# Patient Record
Sex: FEMALE | Race: WHITE | Employment: FULL TIME | URBAN - METROPOLITAN AREA
[De-identification: names, ages, dates, MRNs, and addresses within clinical notes are randomized per-mention and may not be internally consistent; named-entity substitution may affect disease eponyms.]

---

## 2022-07-29 ENCOUNTER — HOSPITAL ENCOUNTER (EMERGENCY)
Age: 22
Discharge: HOME | End: 2022-07-29
Attending: EMERGENCY MEDICINE
Payer: COMMERCIAL

## 2022-07-29 ENCOUNTER — APPOINTMENT (OUTPATIENT)
Dept: GENERAL RADIOLOGY | Age: 22
End: 2022-07-29
Attending: EMERGENCY MEDICINE
Payer: COMMERCIAL

## 2022-07-29 VITALS
HEIGHT: 69 IN | HEART RATE: 103 BPM | BODY MASS INDEX: 19.26 KG/M2 | DIASTOLIC BLOOD PRESSURE: 66 MMHG | TEMPERATURE: 98 F | RESPIRATION RATE: 16 BRPM | OXYGEN SATURATION: 98 % | SYSTOLIC BLOOD PRESSURE: 108 MMHG | WEIGHT: 130 LBS

## 2022-07-29 DIAGNOSIS — S16.1XXA NECK MUSCLE STRAIN, INITIAL ENCOUNTER: Primary | ICD-10-CM
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
## 2022-07-29 PROCEDURE — 72050 X-RAY EXAM NECK SPINE 4/5VWS: CPT

## 2022-07-29 PROCEDURE — 99283 EMERGENCY DEPT VISIT LOW MDM: CPT

## 2022-07-29 RX ORDER — IBUPROFEN 600 MG/1
600 TABLET ORAL
Status: DISCONTINUED | OUTPATIENT
Start: 2022-07-29 | End: 2022-07-29 | Stop reason: HOSPADM

## 2022-07-29 RX ORDER — LIDOCAINE 50 MG/G
PATCH TOPICAL
Qty: 5 EACH | Refills: 0 | Status: SHIPPED | OUTPATIENT
Start: 2022-07-29

## 2022-07-29 RX ORDER — LIDOCAINE 4 G/100G
1 PATCH TOPICAL
Status: DISCONTINUED | OUTPATIENT
Start: 2022-07-29 | End: 2022-07-29 | Stop reason: HOSPADM

## 2022-07-29 RX ORDER — METHOCARBAMOL 750 MG/1
750 TABLET, FILM COATED ORAL 4 TIMES DAILY
Qty: 20 TABLET | Refills: 0 | Status: SHIPPED | OUTPATIENT
Start: 2022-07-29 | End: 2022-08-03

## 2022-07-29 RX ORDER — NAPROXEN 500 MG/1
500 TABLET ORAL 2 TIMES DAILY WITH MEALS
Qty: 14 TABLET | Refills: 0 | Status: SHIPPED | OUTPATIENT
Start: 2022-07-29 | End: 2022-08-05
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
## 2023-09-26 SDOH — HEALTH STABILITY: PHYSICAL HEALTH: ON AVERAGE, HOW MANY MINUTES DO YOU ENGAGE IN EXERCISE AT THIS LEVEL?: 30 MIN

## 2023-09-26 SDOH — HEALTH STABILITY: PHYSICAL HEALTH: ON AVERAGE, HOW MANY DAYS PER WEEK DO YOU ENGAGE IN MODERATE TO STRENUOUS EXERCISE (LIKE A BRISK WALK)?: 4 DAYS

## 2023-09-27 ENCOUNTER — OFFICE VISIT (OUTPATIENT)
Dept: PRIMARY CARE CLINIC | Facility: CLINIC | Age: 23
End: 2023-09-27
Payer: COMMERCIAL

## 2023-09-27 ENCOUNTER — TELEPHONE (OUTPATIENT)
Dept: PRIMARY CARE CLINIC | Facility: CLINIC | Age: 23
End: 2023-09-27

## 2023-09-27 VITALS
HEIGHT: 69 IN | BODY MASS INDEX: 19.7 KG/M2 | DIASTOLIC BLOOD PRESSURE: 68 MMHG | SYSTOLIC BLOOD PRESSURE: 106 MMHG | WEIGHT: 133 LBS | HEART RATE: 71 BPM | OXYGEN SATURATION: 100 %

## 2023-09-27 DIAGNOSIS — Z13.6 ENCOUNTER FOR LIPID SCREENING FOR CARDIOVASCULAR DISEASE: ICD-10-CM

## 2023-09-27 DIAGNOSIS — D68.2 FACTOR II DEFICIENCY (HCC): ICD-10-CM

## 2023-09-27 DIAGNOSIS — D68.2 FACTOR II DEFICIENCY (HCC): Primary | ICD-10-CM

## 2023-09-27 DIAGNOSIS — Z13.220 ENCOUNTER FOR LIPID SCREENING FOR CARDIOVASCULAR DISEASE: ICD-10-CM

## 2023-09-27 DIAGNOSIS — G43.709 CHRONIC MIGRAINE WITHOUT AURA WITHOUT STATUS MIGRAINOSUS, NOT INTRACTABLE: Primary | ICD-10-CM

## 2023-09-27 DIAGNOSIS — G43.709 CHRONIC MIGRAINE W/O AURA, NOT INTRACTABLE, W/O STAT MIGR: ICD-10-CM

## 2023-09-27 PROCEDURE — 99204 OFFICE O/P NEW MOD 45 MIN: CPT | Performed by: FAMILY MEDICINE

## 2023-09-27 RX ORDER — SUMATRIPTAN 50 MG/1
TABLET, FILM COATED ORAL
Qty: 9 TABLET | Refills: 5 | Status: SHIPPED | OUTPATIENT
Start: 2023-09-27

## 2023-09-27 SDOH — ECONOMIC STABILITY: FOOD INSECURITY: WITHIN THE PAST 12 MONTHS, YOU WORRIED THAT YOUR FOOD WOULD RUN OUT BEFORE YOU GOT MONEY TO BUY MORE.: PATIENT DECLINED

## 2023-09-27 SDOH — ECONOMIC STABILITY: INCOME INSECURITY: HOW HARD IS IT FOR YOU TO PAY FOR THE VERY BASICS LIKE FOOD, HOUSING, MEDICAL CARE, AND HEATING?: PATIENT DECLINED

## 2023-09-27 SDOH — ECONOMIC STABILITY: HOUSING INSECURITY
IN THE LAST 12 MONTHS, WAS THERE A TIME WHEN YOU DID NOT HAVE A STEADY PLACE TO SLEEP OR SLEPT IN A SHELTER (INCLUDING NOW)?: PATIENT REFUSED

## 2023-09-27 SDOH — ECONOMIC STABILITY: FOOD INSECURITY: WITHIN THE PAST 12 MONTHS, THE FOOD YOU BOUGHT JUST DIDN'T LAST AND YOU DIDN'T HAVE MONEY TO GET MORE.: PATIENT DECLINED

## 2023-09-27 ASSESSMENT — PATIENT HEALTH QUESTIONNAIRE - PHQ9
SUM OF ALL RESPONSES TO PHQ QUESTIONS 1-9: 0
1. LITTLE INTEREST OR PLEASURE IN DOING THINGS: 0
SUM OF ALL RESPONSES TO PHQ9 QUESTIONS 1 & 2: 0
SUM OF ALL RESPONSES TO PHQ QUESTIONS 1-9: 0
2. FEELING DOWN, DEPRESSED OR HOPELESS: 0
SUM OF ALL RESPONSES TO PHQ QUESTIONS 1-9: 0
SUM OF ALL RESPONSES TO PHQ QUESTIONS 1-9: 0

## 2023-09-28 NOTE — TELEPHONE ENCOUNTER
Called patient - gave information to scheduling team.     Asked patient if she had any metal in her body?  She stated \"no, not that Im aware of\"

## 2023-10-03 ENCOUNTER — OFFICE VISIT (OUTPATIENT)
Age: 23
End: 2023-10-03
Payer: COMMERCIAL

## 2023-10-03 VITALS
HEART RATE: 80 BPM | OXYGEN SATURATION: 98 % | BODY MASS INDEX: 19.55 KG/M2 | DIASTOLIC BLOOD PRESSURE: 73 MMHG | SYSTOLIC BLOOD PRESSURE: 109 MMHG | RESPIRATION RATE: 18 BRPM | WEIGHT: 132.4 LBS

## 2023-10-03 DIAGNOSIS — D68.52 PROTHROMBIN GENE MUTATION (HCC): Primary | ICD-10-CM

## 2023-10-03 PROCEDURE — 99203 OFFICE O/P NEW LOW 30 MIN: CPT | Performed by: INTERNAL MEDICINE

## 2023-10-03 NOTE — PROGRESS NOTES
Kamini Razo is a 24 y.o. female     Chief Complaint   Patient presents with    Follow-up     prothrombin gene mutation       1. Have you been to the ER, urgent care clinic since your last visit? Hospitalized since your last visit? No    2. Have you seen or consulted any other health care providers outside of the [address] [place] [address] Avenue since your last visit? Include any pap smears or colon screening.   Yes , allergy partners

## 2023-10-03 NOTE — PROGRESS NOTES
Kari at [address], [address] [place] [address]  W: [phone]  F: [phone]    Reason for Visit:   Kailey Souza is a 24 y.o. female who is seen in consultation at the request of Dr. Eva Aguilera for evaluation of prothrombin gene mutation. History of Present Illness:   Kailey Souza is a pleasant 24 y.o. female who presents today for evaluation of prothrombin gene mutation. Patient recently seen by PCP to establish care. At that visit, she reported history of prothrombin gene mutation. She has never had a history of a blood clot. She is mindful to avoid estrogen-containing OCPs and has only ever been on progestin-only OCPs. She currently has copper IUD. At that visit, she was complaining of headaches. She states she has had these off/on always but in the last year, they have become more debilitating. She does get photophobia/phonophobia. She has to sleep them off and they go away. If she pushes in on her eye, that pressure alleviates the headache pain. She would take Excederin with good effect but has to be taken at the onset of the headaches, otherwise no improvement. States that she was diagnosed in ~2005 or 2006. Her paternal aunt was pregnant with her first born, and during third trimester developed blood clot. She was the first individual diagnosed. 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 Never smoker. Social EtOH use. Review of systems was obtained and pertinent findings reviewed above. Past medical history, social history, family history, medications, and allergies are located in the electronic medical record.     Physical normal S1, S2 heard

## 2023-10-05 DIAGNOSIS — Z13.220 ENCOUNTER FOR LIPID SCREENING FOR CARDIOVASCULAR DISEASE: ICD-10-CM

## 2023-10-05 DIAGNOSIS — D68.52 PROTHROMBIN GENE MUTATION (HCC): ICD-10-CM

## 2023-10-05 DIAGNOSIS — G43.709 CHRONIC MIGRAINE W/O AURA, NOT INTRACTABLE, W/O STAT MIGR: ICD-10-CM

## 2023-10-05 DIAGNOSIS — Z13.6 ENCOUNTER FOR LIPID SCREENING FOR CARDIOVASCULAR DISEASE: ICD-10-CM

## 2023-10-06 ENCOUNTER — HOSPITAL ENCOUNTER (OUTPATIENT)
Facility: HOSPITAL | Age: 23
Discharge: HOME | End: 2023-10-06
Payer: COMMERCIAL

## 2023-10-06 DIAGNOSIS — G43.709 CHRONIC MIGRAINE WITHOUT AURA WITHOUT STATUS MIGRAINOSUS, NOT INTRACTABLE: ICD-10-CM

## 2023-10-06 DIAGNOSIS — D68.2 FACTOR II DEFICIENCY (HCC): ICD-10-CM

## 2023-10-06 LAB
ALBUMIN SERPL-MCNC: 3.9 G/DL (ref 3.5–5)
ALBUMIN/GLOB SERPL: 1.3 (ref 1.1–2.2)
ALP SERPL-CCNC: 44 U/L (ref 45–117)
ALT SERPL-CCNC: 19 U/L (ref 12–78)
ANION GAP SERPL CALC-SCNC: 4 MMOL/L (ref 5–15)
AST SERPL-CCNC: 8 U/L (ref 15–37)
BILIRUB SERPL-MCNC: 0.7 MG/DL (ref 0.2–1)
BUN SERPL-MCNC: 14 MG/DL (ref 6–20)
BUN/CREAT SERPL: 16 (ref 12–20)
CALCIUM SERPL-MCNC: 9.1 MG/DL (ref 8.5–10.1)
CHLORIDE SERPL-SCNC: 109 MMOL/L (ref 97–108)
CHOLEST SERPL-MCNC: 149 MG/DL
CO2 SERPL-SCNC: 27 MMOL/L (ref 21–32)
CREAT SERPL-MCNC: 0.89 MG/DL (ref 0.55–1.02)
ERYTHROCYTE [DISTWIDTH] IN BLOOD BY AUTOMATED COUNT: 12.3 % (ref 11.5–14.5)
GLOBULIN SER CALC-MCNC: 3.1 G/DL (ref 2–4)
GLUCOSE SERPL-MCNC: 96 MG/DL (ref 65–100)
HCT VFR BLD AUTO: 40 % (ref 35–47)
HDLC SERPL-MCNC: 67 MG/DL
HDLC SERPL: 2.2 (ref 0–5)
HGB BLD-MCNC: 13.3 G/DL (ref 11.5–16)
LDLC SERPL CALC-MCNC: 73 MG/DL (ref 0–100)
MCH RBC QN AUTO: 28.7 PG (ref 26–34)
MCHC RBC AUTO-ENTMCNC: 33.3 G/DL (ref 30–36.5)
MCV RBC AUTO: 86.2 FL (ref 80–99)
NRBC # BLD: 0 K/UL (ref 0–0.01)
NRBC BLD-RTO: 0 PER 100 WBC
PLATELET # BLD AUTO: 230 K/UL (ref 150–400)
PMV BLD AUTO: 12.5 FL (ref 8.9–12.9)
POTASSIUM SERPL-SCNC: 4.4 MMOL/L (ref 3.5–5.1)
PROT SERPL-MCNC: 7 G/DL (ref 6.4–8.2)
RBC # BLD AUTO: 4.64 M/UL (ref 3.8–5.2)
SODIUM SERPL-SCNC: 140 MMOL/L (ref 136–145)
TRIGL SERPL-MCNC: 45 MG/DL
VLDLC SERPL CALC-MCNC: 9 MG/DL
WBC # BLD AUTO: 4.9 K/UL (ref 3.6–11)

## 2023-10-06 PROCEDURE — 70551 MRI BRAIN STEM W/O DYE: CPT

## 2023-10-11 LAB
F2 GENE MUT ANL BLD/T: ABNORMAL
IMP & REVIEW OF LAB RESULTS: ABNORMAL

## 2023-12-01 ENCOUNTER — TELEPHONE (OUTPATIENT)
Dept: PRIMARY CARE CLINIC | Facility: CLINIC | Age: 23
End: 2023-12-01

## 2023-12-01 ENCOUNTER — NURSE TRIAGE (OUTPATIENT)
Dept: OTHER | Facility: CLINIC | Age: 23
End: 2023-12-01

## 2023-12-01 NOTE — TELEPHONE ENCOUNTER
Patient contacted the answering service stating that she has a cold sore on the bottom of her lip that has become painful. She is requesting antiviral meds. Advised her that (per chart review), PCP  would like to see her in office to manage this. Discussed OTC meds that she can take. Patient expressed understanding and will contact office during business hours.

## 2023-12-01 NOTE — TELEPHONE ENCOUNTER
[place]    Received call from [address] at [facility] with [employer] Complaint. Subjective: Caller states \"Cold sore 3/4 inch long, lip swelling on bottom lip. \"     Current Symptoms: See above    Onset: 2 days ago; sudden    Associated Symptoms: NA    Pain Severity: 6/10; burning; intermittent    Temperature: Denies     What has been tried: Yung    LMP:  Currently      Pregnant: NO    Recommended disposition:  Call back by PCP today    Care advice provided, patient verbalizes understanding; denies any other questions or concerns; instructed to call back for any new or worsening symptoms. Attention Provider: Thank you for allowing me to participate in the care of your patient. The patient was connected to triage in response to information provided to the ECC/PSC. Please do not respond through this encounter as the response is not directed to a shared pool.     Reason for Disposition   Herpes sores are a recurrent problem, and caller wants a prescription medicine to take the next time they occur    Protocols used: Cold Sores (Fever Blisters)-ADULT-[place]

## 2023-12-04 ENCOUNTER — TELEPHONE (OUTPATIENT)
Dept: PRIMARY CARE CLINIC | Facility: CLINIC | Age: 23
End: 2023-12-04

## 2023-12-04 NOTE — TELEPHONE ENCOUNTER
----- Message from Talia Barreto sent at 12/1/2023  2:32 PM EST -----  Subject: Message to Provider    QUESTIONS  Information for Provider? Patient has a cold sore on bottom lip and would   like Alaina Arora to send in a script for it. The cold sore is about   3/4 in long and is causing her bottom lip to swell. She has been using   abrievia and it's not helping  ---------------------------------------------------------------------------  --------------  CALL BACK INFO  [phone]; OK to leave message on voicemail  ---------------------------------------------------------------------------  --------------  SCRIPT ANSWERS  Relationship to Patient? Self

## 2023-12-05 NOTE — TELEPHONE ENCOUNTER
LVM for patient that an appointment would be needed for a script for a cold sore.Advised she can also be seen at urgent care.

## 2024-02-04 DIAGNOSIS — G43.709 CHRONIC MIGRAINE W/O AURA, NOT INTRACTABLE, W/O STAT MIGR: ICD-10-CM

## 2024-02-07 RX ORDER — SUMATRIPTAN 50 MG/1
TABLET, FILM COATED ORAL
Qty: 9 TABLET | Refills: 5 | Status: SHIPPED | OUTPATIENT
Start: 2024-02-07

## 2024-03-07 ENCOUNTER — TELEMEDICINE (OUTPATIENT)
Dept: PRIMARY CARE CLINIC | Facility: CLINIC | Age: 24
End: 2024-03-07
Payer: COMMERCIAL

## 2024-03-07 DIAGNOSIS — T78.40XS ALLERGY, SEQUELA: ICD-10-CM

## 2024-03-07 DIAGNOSIS — H01.9 DERMATITIS OF EYELIDS OF BOTH EYES, UNSPECIFIED TYPE: Primary | ICD-10-CM

## 2024-03-07 PROCEDURE — 99204 OFFICE O/P NEW MOD 45 MIN: CPT | Performed by: NURSE PRACTITIONER

## 2024-03-07 RX ORDER — DIAPER,BRIEF,INFANT-TODD,DISP
EACH MISCELLANEOUS 2 TIMES DAILY PRN
Qty: 1 EACH | Refills: 0 | Status: SHIPPED | OUTPATIENT
Start: 2024-03-07

## 2024-03-07 SDOH — ECONOMIC STABILITY: FOOD INSECURITY: WITHIN THE PAST 12 MONTHS, YOU WORRIED THAT YOUR FOOD WOULD RUN OUT BEFORE YOU GOT MONEY TO BUY MORE.: NEVER TRUE

## 2024-03-07 SDOH — ECONOMIC STABILITY: INCOME INSECURITY: HOW HARD IS IT FOR YOU TO PAY FOR THE VERY BASICS LIKE FOOD, HOUSING, MEDICAL CARE, AND HEATING?: NOT HARD AT ALL

## 2024-03-07 SDOH — ECONOMIC STABILITY: FOOD INSECURITY: WITHIN THE PAST 12 MONTHS, THE FOOD YOU BOUGHT JUST DIDN'T LAST AND YOU DIDN'T HAVE MONEY TO GET MORE.: NEVER TRUE

## 2024-03-07 SDOH — ECONOMIC STABILITY: HOUSING INSECURITY
IN THE LAST 12 MONTHS, WAS THERE A TIME WHEN YOU DID NOT HAVE A STEADY PLACE TO SLEEP OR SLEPT IN A SHELTER (INCLUDING NOW)?: NO

## 2024-03-07 ASSESSMENT — PATIENT HEALTH QUESTIONNAIRE - PHQ9
1. LITTLE INTEREST OR PLEASURE IN DOING THINGS: 0
SUM OF ALL RESPONSES TO PHQ QUESTIONS 1-9: 0
SUM OF ALL RESPONSES TO PHQ QUESTIONS 1-9: 0
SUM OF ALL RESPONSES TO PHQ9 QUESTIONS 1 & 2: 0
2. FEELING DOWN, DEPRESSED OR HOPELESS: 0
SUM OF ALL RESPONSES TO PHQ QUESTIONS 1-9: 0
SUM OF ALL RESPONSES TO PHQ QUESTIONS 1-9: 0

## 2024-03-07 ASSESSMENT — ENCOUNTER SYMPTOMS
EYE ITCHING: 1
COLOR CHANGE: 1
EYE REDNESS: 0
EYE DISCHARGE: 0
PHOTOPHOBIA: 0
EYE PAIN: 0

## 2024-03-07 NOTE — PROGRESS NOTES
\"Have you been to the ER, urgent care clinic since your last visit?  Hospitalized since your last visit?\"    Urgent Care 12/24 cold sore ans virus    “Have you seen or consulted any other health care providers outside of [facility] since your last visit?”    Allergist            
10/05/2023 1.3  1.1 - 2.2   Final    WBC 10/05/2023 4.9  3.6 - 11.0 K/uL Final    RBC 10/05/2023 4.64  3.80 - 5.20 M/uL Final    Hemoglobin 10/05/2023 13.3  11.5 - 16.0 g/dL Final    Hematocrit 10/05/2023 40.0  35.0 - 47.0 % Final    MCV 10/05/2023 86.2  80.0 - 99.0 FL Final    MCH 10/05/2023 28.7  26.0 - 34.0 PG Final    MCHC 10/05/2023 33.3  30.0 - 36.5 g/dL Final    RDW 10/05/2023 12.3  11.5 - 14.5 % Final    Platelets 10/05/2023 230  150 - 400 K/uL Final    MPV 10/05/2023 12.5  8.9 - 12.9 FL Final    Nucleated RBCs 10/05/2023 0.0  0  WBC Final    nRBC 10/05/2023 0.00  0.00 - 0.01 K/uL Final       MRI BRAIN WO CONTRAST  Narrative: EXAM: MRI BRAIN WO CONTRAST    INDICATION: Chronic migraine without aura, not intractable, without status  migrainosus; Hereditary deficiency of other clotting factors    COMPARISON: None.    CONTRAST: None.    TECHNIQUE:    Multiplanar multisequence acquisition without contrast of the brain.    FINDINGS:  The ventricles are normal in size and position. The brain parenchyma has normal  signal characteristics. Incidental 8 mm pineal cyst. There is no acute infarct,  hemorrhage, extra-axial fluid collection, or mass effect. There is no cerebellar  tonsillar herniation. Expected arterial flow-voids are present.    The paranasal sinuses, mastoid air cells, and middle ears are clear. The orbital  contents are within normal limits. No significant osseous or scalp lesions are  identified.  Impression: 1. No significant intracranial abnormality. Incidental 8 mm pineal cyst.     Current Outpatient Medications   Medication Sig Dispense Refill    hydrocortisone 0.5 % ointment Apply topically 2 times daily as needed (rash) 1 each 0    SUMAtriptan (IMITREX) 50 MG tablet If symptoms fail to improve after 2 hours take 2nd tab. Do not take more then 2 tabs in 24 hours. 9 tablet 5    Cetirizine HCl (ZYRTEC ALLERGY PO)       Multiple Vitamins-Minerals (MULTI-VITAMIN GUMMIES PO)       EPINEPHRINE,

## 2024-08-05 ENCOUNTER — TELEPHONE (OUTPATIENT)
Age: 24
End: 2024-08-05

## 2024-08-05 NOTE — TELEPHONE ENCOUNTER
Pt called to inquire if there was anything she should be concerned about while training for a marathon in regards to her blood condition. Call back number is [phone].

## 2024-08-07 ENCOUNTER — PATIENT MESSAGE (OUTPATIENT)
Age: 24
End: 2024-08-07

## 2024-08-29 ENCOUNTER — OFFICE VISIT (OUTPATIENT)
Age: 24
End: 2024-08-29
Payer: COMMERCIAL

## 2024-08-29 VITALS
TEMPERATURE: 98.2 F | SYSTOLIC BLOOD PRESSURE: 99 MMHG | HEIGHT: 69 IN | DIASTOLIC BLOOD PRESSURE: 57 MMHG | HEART RATE: 86 BPM | OXYGEN SATURATION: 96 % | BODY MASS INDEX: 19.99 KG/M2 | WEIGHT: 135 LBS

## 2024-08-29 DIAGNOSIS — Z01.419 ENCOUNTER FOR GYNECOLOGICAL EXAMINATION: Primary | ICD-10-CM

## 2024-08-29 PROCEDURE — 99385 PREV VISIT NEW AGE 18-39: CPT | Performed by: OBSTETRICS & GYNECOLOGY

## 2024-08-29 NOTE — PROGRESS NOTES
Chief Complaint   Patient presents with    New Patient    Annual Exam       Ob/Gyn Hx:  G0  LMP - No LMP recorded.  Menses - regular   Contraception - Copper IUD  Hx of STI - none, declined  SA - yes male partner.    Health maintenance:  Last Pap: 2 years ago- normal per patient. Patient notes no history of abnormal pap smears.  Gardasil: series completed.    1. Have you been to the ER, urgent care clinic, or hospitalized since your last visit? This is the  patients first visit with the practice.     2. Have you seen or consulted any other health care providers outside of the [facility] since your last visit?  This is the  patients first visit with the practice.     Patient declines chaperone.    Graciela Tellez MA

## 2024-08-29 NOTE — PROGRESS NOTES
Annual Exam    Chief Complaint   Patient presents with    New Patient    Annual Exam     Megan Hernández is a 24 y.o. presenting for annual exam. Her main concerns today include well woman exam    Her last pap test was approximately 2 years ago. No history of abnormals.    She is sexually active with a male partner. She uses copper IUD for contraception. She notes that it was placed in 2019. She has regular monthly periods with the copper IUD in place.    She denies any history of STIs. She declines STI testing today.    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    She has previously completed her Gardasil vaccine series.    She works as a [employer]. Her  is a 4th year medical student- going into anesthesia.    OB History    Para Term  AB Living   0 0 0 0 0 0   SAB IAB Ectopic Molar Multiple Live Births   0 0 0 0 0 0       Past Medical History:   Diagnosis Date    Disease of blood and blood forming organ     Factor 2 clotting disorder    Factor II deficiency (HCC)     Migraines        Past Surgical History:   Procedure Laterality Date    WISDOM TOOTH EXTRACTION         Family History   Problem Relation Age of Onset    Cancer Maternal Grandfather     Diabetes Paternal Grandmother     Cancer Paternal Grandfather     Other Other     Migraines Mother        Social History     Socioeconomic History    Marital status:      Spouse name: Not on file    Number of children: Not on file    Years of education: Not on file    Highest education level: Not on file   Occupational History    Not on file   Tobacco Use    Smoking status: Never    Smokeless tobacco: Never   Vaping Use    Vaping status: Never Used   Substance and Sexual Activity    Alcohol use: Yes     Alcohol/week: 1.0 standard drink of alcohol     Types: 1 Drinks containing 0.5 oz of alcohol per week     Comment: Social drinking (1 a month?)    Drug use: Never    Sexual    Allergen Reactions    Other Anaphylaxis and Itching     Pitted fruit & nuts  Pitted fruit & nuts      Celery Oil Itching    Flavoring Agent Itching    [place] Flavor Itching    Peanut Extract Allergy Skin Test Itching and Swelling    Tree Extract Hives, Itching and Swelling       Review of Systems - History obtained from the patient  Constitutional: negative for weight loss, fever, night sweats  HEENT: negative for hearing loss, earache, congestion, snoring, sorethroat  CV: negative for chest pain, palpitations, edema  Resp: negative for cough, shortness of breath, wheezing  GI: negative for change in bowel habits, abdominal pain, black or bloody stools  : negative for frequency, dysuria, hematuria, vaginal discharge  MSK: negative for back pain, joint pain, muscle pain  Breast: negative for breast lumps, nipple discharge, galactorrhea  Skin :negative for itching, rash, hives  Neuro: negative for dizziness, headache, confusion, weakness  Psych: negative for anxiety, depression, change in mood  Heme/lymph: negative for bleeding, bruising, pallor    Physical Exam    BP (!) 99/57 (Site: Left Upper Arm, Position: Sitting, Cuff Size: Medium Adult)   Pulse 86   Temp 98.2 °F (36.8 °C)   Ht 1.753 m (5' 9\")   Wt 61.2 kg (135 lb)   LMP 08/15/2024 (Approximate)   SpO2 96%   BMI 19.94 kg/m²     Constitutional  Appearance: well-nourished, well developed, alert, in no acute distress    HENT  Head and Face: appears normal    Neck  Inspection/Palpation: normal appearance, no masses or tenderness  Lymph Nodes: no lymphadenopathy present  Thyroid: gland size normal, nontender, no nodules or masses present on palpation    Chest  Respiratory Effort: non-labored breathing  Auscultation: CTAB, normal breath sounds    Cardiovascular  Heart:  Auscultation: regular rate and rhythm without murmur  Extremities: no peripheral edema    Breasts   Inspection of Breasts: breasts symmetrical, no skin changes, no discharge present, nipple

## 2024-09-03 LAB
., LABCORP: NORMAL
CYTOLOGIST CVX/VAG CYTO: NORMAL
CYTOLOGY CVX/VAG DOC CYTO: NORMAL
CYTOLOGY CVX/VAG DOC THIN PREP: NORMAL
Lab: NORMAL
OTHER STN SPEC: NORMAL
STAT OF ADQ CVX/VAG CYTO-IMP: NORMAL

## 2024-10-03 ENCOUNTER — OFFICE VISIT (OUTPATIENT)
Dept: PRIMARY CARE CLINIC | Facility: CLINIC | Age: 24
End: 2024-10-03

## 2024-10-03 VITALS
HEART RATE: 85 BPM | RESPIRATION RATE: 17 BRPM | HEIGHT: 69 IN | OXYGEN SATURATION: 98 % | WEIGHT: 133 LBS | SYSTOLIC BLOOD PRESSURE: 103 MMHG | DIASTOLIC BLOOD PRESSURE: 67 MMHG | BODY MASS INDEX: 19.7 KG/M2

## 2024-10-03 DIAGNOSIS — Z00.00 WELL WOMAN EXAM (NO GYNECOLOGICAL EXAM): ICD-10-CM

## 2024-10-03 DIAGNOSIS — Z01.84 IMMUNITY STATUS TESTING: ICD-10-CM

## 2024-10-03 DIAGNOSIS — G43.709 CHRONIC MIGRAINE W/O AURA, NOT INTRACTABLE, W/O STAT MIGR: Primary | ICD-10-CM

## 2024-10-03 RX ORDER — SUMATRIPTAN 50 MG/1
TABLET, FILM COATED ORAL
Qty: 9 TABLET | Refills: 11 | Status: SHIPPED | OUTPATIENT
Start: 2024-10-03

## 2024-10-03 NOTE — PROGRESS NOTES
Health Decision Maker has been checked with the patient          AI form was signed    Chief Complaint   Patient presents with    Annual Exam       \"Have you been to the ER, urgent care clinic since your last visit?  Hospitalized since your last visit?\"    NO    “Have you seen or consulted any other health care providers outside of [facility] since your last visit?”    NO      Vitals:    10/03/24 0911   Weight: 60.3 kg (133 lb)      Depression: Not at risk (3/7/2024)    PHQ-2     PHQ-2 Score: 0              Click Here for Release of Records Request        Chart reviewed: immunizations are documented.     There is no immunization history on file for this patient.

## 2024-10-03 NOTE — PROGRESS NOTES
HPI     Chief Complaint   Patient presents with    Annual Exam       History of Present Illness  The patient is a 24-year-old female who is coming in for her annual exam.    She maintains a balanced diet, including meat, fish, fruits, and vegetables. She is currently training for the [place] half marathon, focusing on running rather than weightlifting. She has declined STD testing today. Her last Pap smear was conducted in 08/2024 at [facility] and returned normal results. She received a tetanus vaccine in 2020 or 2021.    She reports that sumatriptan effectively manages her migraines, which tend to occur around her menstrual cycle. If Excedrin does not alleviate her headaches, she resorts to sumatriptan. She has not observed any significant changes in her headaches, symptoms, or severity.    She experiences heel pain when walking, particularly towards the end of the day. She has been using running shoes with additional foam padding. The pain is located at the base of her heel. This issue began during her high school years when she played golf and has been intermittent for the past 7 years. She notes an increase in pain with increased activity and acknowledges that she was not wearing appropriate footwear at the time. She has never had foot x-rays.    SOCIAL HISTORY  She is a nonsmoker. She drinks alcohol socially.    FAMILY HISTORY  Her maternal grand aunt had breast cancer in her 20s and 30s, it went away and then came back in her late 60s and 70s. No family history of colon cancer.     Had PAP in August with [facility].  Maternal great aunt had breast cancer in her 20-30s then returned in 60-70s.     Reviewed PmHx, RxHx, FmHx, SocHx, AllgHx and updated and dated in the chart.    Physical Exam:  /67 (Site: Left Upper Arm, Position: Sitting, Cuff Size: Medium Adult)   Pulse 85   Resp 17   Ht 1.753 m (5' 9\")   Wt 60.3 kg (133 lb)   SpO2 98%   BMI 19.64 kg/m²     Physical Exam  WNWD,

## 2024-11-12 DIAGNOSIS — Z01.84 IMMUNITY STATUS TESTING: ICD-10-CM

## 2024-11-12 DIAGNOSIS — Z00.00 WELL WOMAN EXAM (NO GYNECOLOGICAL EXAM): ICD-10-CM

## 2024-11-12 LAB
ALBUMIN SERPL-MCNC: 3.8 G/DL (ref 3.5–5)
ALBUMIN/GLOB SERPL: 1.2 (ref 1.1–2.2)
ALP SERPL-CCNC: 44 U/L (ref 45–117)
ALT SERPL-CCNC: 20 U/L (ref 12–78)
ANION GAP SERPL CALC-SCNC: 3 MMOL/L (ref 2–12)
AST SERPL-CCNC: 15 U/L (ref 15–37)
BILIRUB SERPL-MCNC: 0.7 MG/DL (ref 0.2–1)
BUN SERPL-MCNC: 16 MG/DL (ref 6–20)
BUN/CREAT SERPL: 18 (ref 12–20)
CALCIUM SERPL-MCNC: 9 MG/DL (ref 8.5–10.1)
CHLORIDE SERPL-SCNC: 109 MMOL/L (ref 97–108)
CHOLEST SERPL-MCNC: 190 MG/DL
CO2 SERPL-SCNC: 27 MMOL/L (ref 21–32)
CREAT SERPL-MCNC: 0.91 MG/DL (ref 0.55–1.02)
ERYTHROCYTE [DISTWIDTH] IN BLOOD BY AUTOMATED COUNT: 14.1 % (ref 11.5–14.5)
GLOBULIN SER CALC-MCNC: 3.1 G/DL (ref 2–4)
GLUCOSE SERPL-MCNC: 94 MG/DL (ref 65–100)
HBV SURFACE AB SER QL: NONREACTIVE
HBV SURFACE AB SER-ACNC: <3.1 MIU/ML
HCT VFR BLD AUTO: 36.4 % (ref 35–47)
HDLC SERPL-MCNC: 83 MG/DL
HDLC SERPL: 2.3 (ref 0–5)
HGB BLD-MCNC: 11.7 G/DL (ref 11.5–16)
LDLC SERPL CALC-MCNC: 98.4 MG/DL (ref 0–100)
MCH RBC QN AUTO: 27.4 PG (ref 26–34)
MCHC RBC AUTO-ENTMCNC: 32.1 G/DL (ref 30–36.5)
MCV RBC AUTO: 85.2 FL (ref 80–99)
NRBC # BLD: 0 K/UL (ref 0–0.01)
NRBC BLD-RTO: 0 PER 100 WBC
PLATELET # BLD AUTO: 224 K/UL (ref 150–400)
PMV BLD AUTO: 12.8 FL (ref 8.9–12.9)
POTASSIUM SERPL-SCNC: 4.1 MMOL/L (ref 3.5–5.1)
PROT SERPL-MCNC: 6.9 G/DL (ref 6.4–8.2)
RBC # BLD AUTO: 4.27 M/UL (ref 3.8–5.2)
SODIUM SERPL-SCNC: 139 MMOL/L (ref 136–145)
TRIGL SERPL-MCNC: 43 MG/DL
VLDLC SERPL CALC-MCNC: 8.6 MG/DL
WBC # BLD AUTO: 5 K/UL (ref 3.6–11)

## 2025-07-14 ENCOUNTER — TELEPHONE (OUTPATIENT)
Dept: PRIMARY CARE CLINIC | Facility: CLINIC | Age: 25
End: 2025-07-14

## 2025-07-14 NOTE — TELEPHONE ENCOUNTER
Called patient back, patient stated the  staff told her it was unlikely a medication/cream could be sent in so she went to urgent care today and was prescribed something there. I asked her to call us back to let us know how if symptoms to do improve with the medication.

## 2025-07-14 NOTE — TELEPHONE ENCOUNTER
Patient calling wanting to know if Dr. Arora can sen din ointments for something for core sore she is developing on her lip.